# Patient Record
Sex: FEMALE | Race: BLACK OR AFRICAN AMERICAN | NOT HISPANIC OR LATINO | URBAN - METROPOLITAN AREA
[De-identification: names, ages, dates, MRNs, and addresses within clinical notes are randomized per-mention and may not be internally consistent; named-entity substitution may affect disease eponyms.]

---

## 2024-04-17 ENCOUNTER — HOSPITAL ENCOUNTER (EMERGENCY)
Facility: HOSPITAL | Age: 59
Discharge: HOME | End: 2024-04-17
Payer: COMMERCIAL

## 2024-04-17 ENCOUNTER — APPOINTMENT (OUTPATIENT)
Dept: RADIOLOGY | Facility: HOSPITAL | Age: 59
End: 2024-04-17
Payer: COMMERCIAL

## 2024-04-17 VITALS
OXYGEN SATURATION: 100 % | HEART RATE: 70 BPM | BODY MASS INDEX: 38.99 KG/M2 | TEMPERATURE: 98.3 F | WEIGHT: 234 LBS | RESPIRATION RATE: 17 BRPM | HEIGHT: 65 IN | DIASTOLIC BLOOD PRESSURE: 72 MMHG | SYSTOLIC BLOOD PRESSURE: 118 MMHG

## 2024-04-17 DIAGNOSIS — S09.90XA HEAD INJURY, INITIAL ENCOUNTER: Primary | ICD-10-CM

## 2024-04-17 DIAGNOSIS — T14.8XXA MUSCLE STRAIN: ICD-10-CM
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
## 2024-04-17 PROCEDURE — 2500000001 HC RX 250 WO HCPCS SELF ADMINISTERED DRUGS (ALT 637 FOR MEDICARE OP): Performed by: SURGERY

## 2024-04-17 PROCEDURE — 73030 X-RAY EXAM OF SHOULDER: CPT | Mod: RIGHT SIDE | Performed by: RADIOLOGY

## 2024-04-17 PROCEDURE — 73030 X-RAY EXAM OF SHOULDER: CPT | Mod: RT

## 2024-04-17 PROCEDURE — 73080 X-RAY EXAM OF ELBOW: CPT | Mod: RT

## 2024-04-17 PROCEDURE — 70450 CT HEAD/BRAIN W/O DYE: CPT

## 2024-04-17 PROCEDURE — 73502 X-RAY EXAM HIP UNI 2-3 VIEWS: CPT | Mod: RT

## 2024-04-17 PROCEDURE — 99285 EMERGENCY DEPT VISIT HI MDM: CPT | Mod: 25

## 2024-04-17 PROCEDURE — 72100 X-RAY EXAM L-S SPINE 2/3 VWS: CPT

## 2024-04-17 PROCEDURE — 70450 CT HEAD/BRAIN W/O DYE: CPT | Performed by: RADIOLOGY

## 2024-04-17 PROCEDURE — 73130 X-RAY EXAM OF HAND: CPT | Mod: RT

## 2024-04-17 PROCEDURE — 72125 CT NECK SPINE W/O DYE: CPT | Performed by: RADIOLOGY

## 2024-04-17 PROCEDURE — 73080 X-RAY EXAM OF ELBOW: CPT | Mod: RIGHT SIDE | Performed by: RADIOLOGY

## 2024-04-17 PROCEDURE — 96372 THER/PROPH/DIAG INJ SC/IM: CPT | Performed by: SURGERY

## 2024-04-17 PROCEDURE — 73502 X-RAY EXAM HIP UNI 2-3 VIEWS: CPT | Mod: RIGHT SIDE | Performed by: RADIOLOGY

## 2024-04-17 PROCEDURE — 2500000004 HC RX 250 GENERAL PHARMACY W/ HCPCS (ALT 636 FOR OP/ED): Performed by: SURGERY

## 2024-04-17 PROCEDURE — 72100 X-RAY EXAM L-S SPINE 2/3 VWS: CPT | Performed by: RADIOLOGY

## 2024-04-17 PROCEDURE — 73130 X-RAY EXAM OF HAND: CPT | Mod: RIGHT SIDE | Performed by: RADIOLOGY

## 2024-04-17 PROCEDURE — 72125 CT NECK SPINE W/O DYE: CPT

## 2024-04-17 RX ORDER — LIDOCAINE 50 MG/G
1 PATCH TOPICAL DAILY
Qty: 10 PATCH | Refills: 0 | Status: SHIPPED | OUTPATIENT
Start: 2024-04-17

## 2024-04-17 RX ORDER — NAPROXEN 500 MG/1
500 TABLET ORAL
Qty: 30 TABLET | Refills: 0 | Status: SHIPPED | OUTPATIENT
Start: 2024-04-17 | End: 2024-05-02

## 2024-04-17 RX ORDER — KETOROLAC TROMETHAMINE 30 MG/ML
30 INJECTION, SOLUTION INTRAMUSCULAR; INTRAVENOUS ONCE
Status: COMPLETED | OUTPATIENT
Start: 2024-04-17 | End: 2024-04-17

## 2024-04-17 RX ORDER — CYCLOBENZAPRINE HCL 10 MG
10 TABLET ORAL 2 TIMES DAILY PRN
Qty: 20 TABLET | Refills: 0 | Status: SHIPPED | OUTPATIENT
Start: 2024-04-17 | End: 2024-04-27

## 2024-04-17 RX ORDER — ACETAMINOPHEN 325 MG/1
650 TABLET ORAL ONCE
Status: COMPLETED | OUTPATIENT
Start: 2024-04-17 | End: 2024-04-17

## 2024-04-17 RX ORDER — CYCLOBENZAPRINE HCL 10 MG
10 TABLET ORAL ONCE
Status: COMPLETED | OUTPATIENT
Start: 2024-04-17 | End: 2024-04-17

## 2024-04-17 RX ADMIN — ACETAMINOPHEN 650 MG: 325 TABLET ORAL at 21:00

## 2024-04-17 RX ADMIN — KETOROLAC TROMETHAMINE 30 MG: 30 INJECTION, SOLUTION INTRAMUSCULAR at 21:00

## 2024-04-17 RX ADMIN — CYCLOBENZAPRINE 10 MG: 10 TABLET, FILM COATED ORAL at 21:00

## 2024-04-17 ASSESSMENT — COLUMBIA-SUICIDE SEVERITY RATING SCALE - C-SSRS
1. IN THE PAST MONTH, HAVE YOU WISHED YOU WERE DEAD OR WISHED YOU COULD GO TO SLEEP AND NOT WAKE UP?: NO
2. HAVE YOU ACTUALLY HAD ANY THOUGHTS OF KILLING YOURSELF?: NO
6. HAVE YOU EVER DONE ANYTHING, STARTED TO DO ANYTHING, OR PREPARED TO DO ANYTHING TO END YOUR LIFE?: NO

## 2024-04-17 ASSESSMENT — PAIN DESCRIPTION - LOCATION: LOCATION: HEAD

## 2024-04-17 ASSESSMENT — PAIN SCALES - GENERAL: PAINLEVEL_OUTOF10: 6

## 2024-04-17 NOTE — LETTER
April 17, 2024    Patient: Jessenia Curtis   YOB: 1965   Date of Visit: 4/17/2024       To Whom It May Concern:    Jessenia Curtis was seen and treated in our emergency department on 4/17/2024. She may return on 4/19/24    If you have any questions or concerns, please don't hesitate to call.              CC: No Recipients

## 2024-04-17 NOTE — ED TRIAGE NOTES
Pt to ed with c/o fall. Pt tripped over a wood plank at work today. Pt fell and hit [place]. States she hit her head and has ha all day with right sided body pain. - thinners -loc

## 2024-04-17 NOTE — ED PROVIDER NOTES
Chief Complaint   Patient presents with    Fall     HPI:   Jessenia Curtis is an 59 y.o. female with a past medical history of HTN, arthritis and obesity presenting after a fall that occurred this morning as she was walking into work.  She explains she experienced a mechanical fall over a wood plank on the ground tripped and fell about 10 feet into a ceramic pot on the right side of her body.  Explains that she hit her head on the pot and scraped her right upper and lower extremity on the pavement.  She did not lose consciousness.  She states initially she did feel nauseated which has since subsided.  She did go to work following the incident.  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  Explains that she did develop increasing pain in the right side of her head over the course today and explains that it is the worst headache of her life.  She denies any focal neuro weaknesses or general weakness.  Did not lose consciousness and does not take blood thinners.  Endorses pain in her right cranium, shoulder, right elbow, and right hip.  Denies changes in vision or feeling dizzy or lightheaded.       Allergies   Allergen Reactions    Iodine Anaphylaxis    Penicillins Swelling and Hives     hives    Iodinated Contrast Media Swelling   :  No past medical history on file.  Past Surgical History:   Procedure Laterality Date    HYSTERECTOMY  03/12/2018    Hysterectomy    TONSILLECTOMY  03/12/2018    Tonsillectomy     No family history on file.     Physical Exam  Vitals and nursing note reviewed.   Constitutional:       General: She is not in acute distress.     Appearance: She is well-developed.   HENT:      Head: Normocephalic and atraumatic.   Eyes:      Conjunctiva/sclera: Conjunctivae normal.   Cardiovascular:      Rate and Rhythm: Normal rate and regular rhythm.      Heart sounds: No murmur heard.  Pulmonary:      Effort: Pulmonary effort is normal. No respiratory distress.      Breath sounds: Normal breath sounds.    Abdominal:      Palpations: Abdomen is soft.      Tenderness: There is no abdominal tenderness.   Musculoskeletal:         General: No swelling.      Cervical back: Neck supple.   Skin:     General: Skin is warm and dry.      Capillary Refill: Capillary refill takes less than 2 seconds.      Comments: Small 2 cm abrasion over her right elbow without swelling or drainage.   Neurological:      Mental Status: She is alert.   Psychiatric:         Mood and Affect: Mood normal.        VS: As documented in the triage note and EMR flowsheet from this visit were reviewed.    Medical Decision Making: This is a 59-year-old female with a past medical history of obesity, arthritis, HTN presenting after a mechanical fall that occurred when she was walking into work today.  She was able to complete the workday without issues.  Differential includes sprain, strain, fracture, dislocation, intracranial hemorrhage, shock  On exam her vitals are stable she was in no acute distress she was fully ambulatory without antalgic gait.  She is a good historian she is articulate no slurred speech she is behaving as normal. NIH is 0.  No focal neurodeficits.   X-rays were obtained of the right shoulder, elbow, hand which showed no acute pathology.  X-rays of the lumbar spine showed moderate spondylosis otherwise within normal limits.  The right hip was normal-appearing no signs of fracture.  CT head did not show any intracranial abnormalities.  Likely inflammatory musculoskeletal etiology. Patient was treated with Tylenol, Flexeril, Toradol for her pain and these medications were sent to her pharmacy.  Patient was reassured by her imaging results.    Procedures     Chronic Medical Conditions Significantly Affecting Care:      Escalation of Care: Appropriate for outpatient    Prescription Drug Consideration: NSAIDS, muscle relaxers    Counseling: Spoke with the patient and discussed today´s findings, in addition to providing specific details  for the plan of care and expected course.  Patient was given the opportunity to ask questions.    Discussed return precautions and importance of follow-up.  Advised to follow-up with PCP.  I specifically advised to return to the ED for changing or worsening symptoms, new symptoms, complaint specific precautions, and precautions listed on the discharge paperwork.  Educated on the common potential side effects of medications prescribed.    I advised the patient that the emergency evaluation and treatment provided today doesn't end their need for medical care. It is very important that they follow-up with their primary care provider or other specialist as instructed.    The plan of care was mutually agreed upon with the patient. The patient and/or family were given the opportunity to ask questions. All questions asked today in the ED were answered to the best of my ability with today's information.    This patient was cared for in the setting of nationwide stress on resources and staffing.    This report was transcribed using voice recognition software.  Every effort was made to ensure accuracy, however, inadvertently computerized transcription errors may be present.       Lonnie Gaines PA-C  04/17/24 0000

## 2024-10-23 ENCOUNTER — HOSPITAL ENCOUNTER (OUTPATIENT)
Dept: RADIOLOGY | Facility: HOSPITAL | Age: 59
Discharge: HOME | End: 2024-10-23
Payer: COMMERCIAL

## 2024-10-23 DIAGNOSIS — S63.91XA SPRAIN OF UNSPECIFIED PART OF RIGHT WRIST AND HAND, INITIAL ENCOUNTER: ICD-10-CM

## 2024-10-23 PROCEDURE — 73221 MRI JOINT UPR EXTREM W/O DYE: CPT | Mod: RT

## 2024-11-07 NOTE — PROGRESS NOTES
[facility]  Hand and Upper Extremity Service  Initial evaluation / Consultation         Consult requested by Referring Physician: Dr. Noriega     Chief Complaint: Right wrist          59 y.o right hand dominant female presenting for second opinion of right wrist injury. She fell at work in mid-April and struck her head and injured her right wrist. Early on, more of her issues were related to her head injury but now she reports persistent ulnar sided pain. Her treatment has included therapy and Voltaren gel. She hasn't been provided a splint but one has been requested by her therapist. Her symptoms of the right wrist haven't improved and she reports occasional sharp shooting pain with gripping and twisting type maneuvers. She completed an MRI of the right wrist and presents for review of those results.           Please refer to New Patient Intake Form scanned into patient's electronic record for self reported past medical history, past surgical history, medications, allergies, family history, social history and 10 point review of systems    Examination:  Constitutional: Oriented to person, place, and time.  Appears well-developed and well-nourished.  Head: Normocephalic and atraumatic.  Eyes: Pupils are equal, round, and reactive to light.  Cardiovascular: Intact distal pulses.  Pulmonary/Chest/Breast: Effort normal. No respiratory distress.  Neurological: Alert and oriented to person, place, and time.  Skin: Skin is warm and dry.  Psychiatric: normal mood and affect.  Behavior is normal.  Musculoskeletal: Right wrist reveals normal findings. No swelling, skin changes, or deformities. No tenderness to palpation on radial aspect of wrist but diffuse tenderness on ulnar head and ulnocarpal joint. No pain with forearm rotation and no evidence of DRUJ instability. Positive ECU synergy sign but no evidence of ECU tendon instability. Positive TFCC grind test.        Personal  Interpretation of Diagnostic studies: MRI of right wrist dated 10/23 reveals findings consistent with ulnocarpal impaction syndrome. Ulnar neutral variance but clear evidence of cystic changes on proximal and ulnar aspect of lunate bone and signal changes throughout TFCC. Signal changes within ECU tendon but tendon appears to be stable within its anatomic position on dorsal ulnar aspect of ulnar head.        Impression:  Ulnocarpal impaction syndrome       Plan: I don't think this is a direct correlation to her fall in April but she was asymptomatic before the fall. Based on her report, I think this represents a substantial aggravation of an otherwise pre-existing asymptomatic condition. We discussed the forced transmission across the ulnocarpal joint and how increased load across the joint can develop into these problems. Her ulnar neutral variance would suggest this is a more of a dynamic issue than static issue. She should continue therapy but I think she'd be a good candidate for a steroid injection before consideration of any surgery and this will continue to allow her to progress with rehab for head and brain injury. I'll communicate with her physician of record to see if we can gain approval for injection and we'll get her back into the office for this procedure once approved.       Follow-up once approval received for right wrist corticosteroid injection.                  Geovani Yan MD  [facility]  Department of Orthopaedic Surgery  Hand and Upper Extremity Reconstruction      Scribe Attestation  By signing my name below, I, Willem Dela Cruz , [employer]   attest that this documentation has been prepared under the direction and in the presence of Dr. Geovani Yan.      Dictation performed with the use of voice recognition software.  Syntax and grammatical errors may exist.

## 2024-11-12 ENCOUNTER — OFFICE VISIT (OUTPATIENT)
Dept: ORTHOPEDIC SURGERY | Facility: HOSPITAL | Age: 59
End: 2024-11-12
Payer: COMMERCIAL

## 2024-11-12 VITALS — WEIGHT: 234 LBS | HEIGHT: 65 IN | BODY MASS INDEX: 38.99 KG/M2

## 2024-11-12 DIAGNOSIS — Z02.6 ENCOUNTER RELATED TO WORKER'S COMPENSATION CLAIM: Primary | ICD-10-CM

## 2024-11-12 DIAGNOSIS — M77.8 RIGHT WRIST TENDINITIS: ICD-10-CM

## 2024-11-12 DIAGNOSIS — M25.831 ULNOCARPAL IMPACTION SYNDROME, RIGHT: ICD-10-CM

## 2024-11-12 PROCEDURE — 99214 OFFICE O/P EST MOD 30 MIN: CPT | Performed by: ORTHOPAEDIC SURGERY

## 2024-11-12 PROCEDURE — 99204 OFFICE O/P NEW MOD 45 MIN: CPT | Performed by: ORTHOPAEDIC SURGERY

## 2024-11-12 ASSESSMENT — PAIN - FUNCTIONAL ASSESSMENT: PAIN_FUNCTIONAL_ASSESSMENT: 0-10

## 2024-11-12 ASSESSMENT — PAIN SCALES - GENERAL: PAINLEVEL_OUTOF10: 5 - MODERATE PAIN

## 2024-11-12 ASSESSMENT — PAIN DESCRIPTION - DESCRIPTORS: DESCRIPTORS: ACHING;SORE

## 2024-11-12 NOTE — LETTER
November 12, 2024     King Dyson MD  [address]  [facility] [address]    Patient: Jessenia Curtis   YOB: 1965   Date of Visit: 11/12/2024       Dear Dr. King Dyson MD:    Thank you for referring Jessenia Curtis to me for evaluation. Below are my notes for this consultation.  If you have questions, please do not hesitate to call me. I look forward to following your patient along with you.       Sincerely,     Geovani Yan MD      CC: Rodrigo Noriega MD  ______________________________________________________________________________________    [facility]  Hand and Upper Extremity Service  Initial evaluation / Consultation         Consult requested by Referring Physician: Dr. Noreiga     Chief Complaint: Right wrist          59 y.o right hand dominant female presenting for second opinion of right wrist injury. She fell at work in mid-April and struck her head and injured her right wrist. Early on, more of her issues were related to her head injury but now she reports persistent ulnar sided pain. Her treatment has included therapy and Voltaren gel. She hasn't been provided a splint but one has been requested by her therapist. Her symptoms of the right wrist haven't improved and she reports occasional sharp shooting pain with gripping and twisting type maneuvers. She completed an MRI of the right wrist and presents for review of those results.           Please refer to New Patient Intake Form scanned into patient's electronic record for self reported past medical history, past surgical history, medications, allergies, family history, social history and 10 point review of systems    Examination:  Constitutional: Oriented to person, place, and time.  Appears well-developed and well-nourished.  Head: Normocephalic and atraumatic.  Eyes: Pupils are equal, round, and reactive to light.  Cardiovascular: Intact distal  pulses.  Pulmonary/Chest/Breast: Effort normal. No respiratory distress.  Neurological: Alert and oriented to person, place, and time.  Skin: Skin is warm and dry.  Psychiatric: normal mood and affect.  Behavior is normal.  Musculoskeletal: Right wrist reveals normal findings. No swelling, skin changes, or deformities. No tenderness to palpation on radial aspect of wrist but diffuse tenderness on ulnar head and ulnocarpal joint. No pain with forearm rotation and no evidence of DRUJ instability. Positive ECU synergy sign but no evidence of ECU tendon instability. Positive TFCC grind test.        Personal Interpretation of Diagnostic studies: MRI of right wrist dated 10/23 reveals findings consistent with ulnocarpal impaction syndrome. Ulnar neutral variance but clear evidence of cystic changes on proximal and ulnar aspect of lunate bone and signal changes throughout TFCC. Signal changes within ECU tendon but tendon appears to be stable within its anatomic position on dorsal ulnar aspect of ulnar head.        Impression:  Ulnocarpal impaction syndrome       Plan: I don't think this is a direct correlation to her fall in April but she was asymptomatic before the fall. Based on her report, I think this represents a substantial aggravation of an otherwise pre-existing asymptomatic condition. We discussed the forced transmission across the ulnocarpal joint and how increased load across the joint can develop into these problems. Her ulnar neutral variance would suggest this is a more of a dynamic issue than static issue. She should continue therapy but I think she'd be a good candidate for a steroid injection before consideration of any surgery and this will continue to allow her to progress with rehab for head and brain injury. I'll communicate with her physician of record to see if we can gain approval for injection and we'll get her back into the office for this procedure once approved.       Follow-up once approval  received for right wrist corticosteroid injection.                  Geovani Yan MD  [facility]  Department of Orthopaedic Surgery  Hand and Upper Extremity Reconstruction      Scribe Attestation  By signing my name below, I, Cr Vaughan   attest that this documentation has been prepared under the direction and in the presence of Dr. Geovani Yan.      Dictation performed with the use of voice recognition software.  Syntax and grammatical errors may exist.